# Patient Record
(demographics unavailable — no encounter records)

---

## 2025-03-24 NOTE — DISCUSSION/SUMMARY
[Normal Growth] : growth [Normal Development] : development  [No Elimination Concerns] : elimination [Continue Regimen] : feeding [No Skin Concerns] : skin [Normal Sleep Pattern] : sleep [None] : no medical problems [Anticipatory Guidance Given] : Anticipatory guidance addressed as per the history of present illness section [Age Approp Vaccines] : Age appropriate vaccines administered [No Medications] : ~He/She~ is not on any medications [Parent/Guardian] : Parent/Guardian [] : The components of the vaccine(s) to be administered today are listed in the plan of care. The disease(s) for which the vaccine(s) are intended to prevent and the risks have been discussed with the caretaker.  The risks are also included in the appropriate vaccination information statements which have been provided to the patient's caregiver.  The caregiver has given consent to vaccinate. [FreeTextEntry1] : Well 2 mos old.  Vaccines given.  Increase feeds if will take.  Poly Vi Sol with iron, stop vit D, written dircns for use. store safely  [place] screen - score 10. Last question #10 Hardly Ever. Disc in detail with mother.  No hx of depression anxiety or any pscyh history mother says.  Does get some intrusive thoughts, does not plan on acting on any of them. Does not want to hurt the baby or herself. Mother has told others and they have told her this is normal.  Mother tears up when discussing this.  Gave mother a note re strongly advised to call outpt Post partum support [phone]. Gave online resources also.  Mother interested in going. Father took paper.  I also gave mother the [facility] handout with PPD referral info.  Stressed to mother very important she call them and go for help. Mother expressed agreement.  To ER immediately if think might hurt the baby or yourself or anyone else.   I spoke with  PPD staff member [place], who screens the patients who call the number above. She said there is no PPD crisis clinic, just the regular psych one.  The patient has to call themselves, we cannot give infor and have them called.   Advised both parents to be supportive of each other.    Anticipatory guidance, safety in detail.  Safe crib, back sleep. No soft bedding, no fluffy items in crib. No swaddling.  Do not overdress.  Pacifier use discussed, start after 1 month old if wanted.  Do not leave baby on adult bed or sofa, where baby might roll off.  No pillows around baby. Prevent falls.  No sick visitors.   Avoid contact with people with cold sores.   Fever = rectal temp 100.4 or more. Call us or come in to office for fever.   No honey until after age 1 year old.  Feeding discussed in detail.  No baby food until age 4 months at least.  Stop Vitamin D and start PVS Fe 1 ml a day. Store in safe place away from children.   Car seat use disc, proper use.  Always keep baby fully buckled when in car seat, whether in car or out of car. Do not raise head of bed. Do not leave baby in swing or baby seat or car seat unobserved.  Care that head cannot fall forward and cause trouble breathing. Take baby out and put on back on flat mattress in crib or bassinet when not directly observed.  Smoke detector, CO detector, FE in kitchen, water temp < 125 F.

## 2025-03-24 NOTE — PHYSICAL EXAM
[Alert] : alert [Normocephalic] : normocephalic [Flat Open Anterior Maurice] : flat open anterior fontanelle [PERRL] : PERRL [Red Reflex Bilateral] : red reflex bilateral [Normally Placed Ears] : normally placed ears [Auricles Well Formed] : auricles well formed [Clear Tympanic membranes] : clear tympanic membranes [Light reflex present] : light reflex present [Bony landmarks visible] : bony landmarks visible [Nares Patent] : nares patent [Palate Intact] : palate intact [Uvula Midline] : uvula midline [Supple, full passive range of motion] : supple, full passive range of motion [Symmetric Chest Rise] : symmetric chest rise [Clear to Auscultation Bilaterally] : clear to auscultation bilaterally [Regular Rate and Rhythm] : regular rate and rhythm [S1, S2 present] : S1, S2 present [+2 Femoral Pulses] : +2 femoral pulses [Soft] : soft [Bowel Sounds] : bowel sounds present [Normal external genitailia] : normal external genitalia [Central Urethral Opening] : central urethral opening [Testicles Descended Bilaterally] : testicles descended bilaterally [Normally Placed] : normally placed [No Abnormal Lymph Nodes Palpated] : no abnormal lymph nodes palpated [Symmetric Flexed Extremities] : symmetric flexed extremities [Startle Reflex] : startle reflex present [Suck Reflex] : suck reflex present [Rooting] : rooting reflex present [Palmar Grasp] : palmar grasp reflex present [Plantar Grasp] : plantar grasp reflex present [Symmetric Leilani] : symmetric Dawson [Acute Distress] : no acute distress [Discharge] : no discharge [Palpable Masses] : no palpable masses [Murmurs] : no murmurs [Breast Tissue] : no prominent breast tissue [Tender] : nontender [Distended] : not distended [Hepatomegaly] : no hepatomegaly [Splenomegaly] : no splenomegaly [Mcgowan-Ortolani] : negative Mcgowan-Ortolani [Spinal Dimple] : no spinal dimple [Tuft of Hair] : no tuft of hair [Rash and/or lesion present] : no rash/lesion [FreeTextEntry1] : NAD, active [FreeTextEntry5] : RR++  [FreeTextEntry8] : RR no murmur [FreeTextEntry9] : nl [FreeTextEntry6] : nl testes desc bilat [de-identified] : Mcgowan/Ortolani normal, Galeazzi test normal.  Leg length equal, creases symmetrical, no hip click or clunk. No MA or ITT.

## 2025-03-24 NOTE — HISTORY OF PRESENT ILLNESS
[Parents] : parents [Breast milk] : breast milk [Formula ___ oz/feed] : [unfilled] oz of formula per feed [Vitamins ___] : Patient takes [unfilled] vitamins daily [Normal] : Normal [No] : No cigarette smoke exposure [Water heater temperature set at <120 degrees F] : Water heater temperature set at <120 degrees F [Rear facing car seat in back seat] : Rear facing car seat in back seat [Carbon Monoxide Detectors] : Carbon monoxide detectors at home [Smoke Detectors] : Smoke detectors at home. [At risk for exposure to TB] : Not at risk for exposure to Tuberculosis  [FreeTextEntry1] : 2 mos old infant. Sees follows hears coos. Interacts, smiles.   Safe bassinet or crib. Uses car seat, facing back of car.   Diet- breast milk, formula.   Vitamins - D parents

## 2025-04-24 NOTE — DISCUSSION/SUMMARY
[Normal Growth] : growth [Normal Development] : development  [No Elimination Concerns] : elimination [Continue Regimen] : feeding [Normal Sleep Pattern] : sleep [None] : no medical problems [Anticipatory Guidance Given] : Anticipatory guidance addressed as per the history of present illness section [Age Approp Vaccines] : Age appropriate vaccines administered [No Medications] : ~He/She~ is not on any medications [Parent/Guardian] : Parent/Guardian [] : The components of the vaccine(s) to be administered today are listed in the plan of care. The disease(s) for which the vaccine(s) are intended to prevent and the risks have been discussed with the caretaker.  The risks are also included in the appropriate vaccination information statements which have been provided to the patient's caregiver.  The caregiver has given consent to vaccinate. [FreeTextEntry1] : Well infant mild eczema on face, dry skin on body moisturize, try cereve products.  HC1% bid on face rash, only if worse, bid x few days only.   RTO one mos  Hep B #2 given.

## 2025-04-24 NOTE — PHYSICAL EXAM
[Alert] : alert [Normocephalic] : normocephalic [Flat Open Anterior [place]] : flat open anterior fontanelle [PERRL] : PERRL [Red Reflex Bilateral] : red reflex bilateral [Normally Placed Ears] : normally placed ears [Auricles Well Formed] : auricles well formed [Clear Tympanic membranes] : clear tympanic membranes [Light reflex present] : light reflex present [Bony landmarks visible] : bony landmarks visible [Nares Patent] : nares patent [Palate Intact] : palate intact [Uvula Midline] : uvula midline [Supple, full passive range of motion] : supple, full passive range of motion [Symmetric Chest Rise] : symmetric chest rise [Clear to Auscultation Bilaterally] : clear to auscultation bilaterally [Regular Rate and Rhythm] : regular rate and rhythm [S1, S2 present] : S1, S2 present [+2 Femoral Pulses] : +2 femoral pulses [Soft] : soft [Bowel Sounds] : bowel sounds present [Normal external genitalia] : normal external genitalia [Circumcised] : circumcised [Central Urethral Opening] : central urethral opening [Testicles Descended Bilaterally] : testicles descended bilaterally [Normally Placed] : normally placed [No Abnormal Lymph Nodes Palpated] : no abnormal lymph nodes palpated [Symmetric Flexed Extremities] : symmetric flexed extremities [Startle Reflex] : startle reflex present [Suck Reflex] : suck reflex present [Rooting] : rooting reflex present [Palmar Grasp] : palmar grasp reflex present [Plantar Grasp] : plantar grasp reflex present [Symmetric Leilani] : symmetric Manning [Acute Distress] : no acute distress [Discharge] : no discharge [Palpable Masses] : no palpable masses [Murmurs] : no murmurs [Breast Tissue] : no prominent breast tissue [Tender] : nontender [Distended] : not distended [Hepatomegaly] : no hepatomegaly [Splenomegaly] : no splenomegaly [Mcgowan-Ortolani] : negative Mcgowan-Ortolani [Spinal Dimple] : no spinal dimple [Tuft of Hair] : no tuft of hair [Rash and/or lesion present] : no rash/lesion [FreeTextEntry1] : NAD [FreeTextEntry5] : RR++ LR= [FreeTextEntry8] : RR no murmur [FreeTextEntry9] : nl [FreeTextEntry6] : nl [de-identified] : Mcgowan/Ortolani normal, Galeazzi test normal.  Leg length equal, creases symmetrical, no hip click or clunk. No MA or ITT. [de-identified] : mild dry skin on body pink mild on cheeks

## 2025-04-24 NOTE — HISTORY OF PRESENT ILLNESS
[Breast milk] : breast milk [Expressed Breast milk ___oz/feed] : [unfilled] oz of expressed breast milk per feed [Vitamins ___] : Patient takes [unfilled] vitamins daily [Normal] : Normal [No] : No cigarette smoke exposure [Water heater temperature set at <120 degrees F] : Water heater temperature set at <120 degrees F [Rear facing car seat in back seat] : Rear facing car seat in back seat [Carbon Monoxide Detectors] : Carbon monoxide detectors at home [Smoke Detectors] : Smoke detectors at home. [At risk for exposure to TB] : Not at risk for exposure to Tuberculosis  [FreeTextEntry1] : 3 mos old.  Hears coos.  Follows.   Interacts well, good eye contact.  Smiles, responsive socially.  Up on bent arms.  Rolls - no  Diet- breast fed, nursed and pumps  Sleep- fed q 3 hrs night.   PVS Fe  Mother saw PPD clinic, advised group session and one on one. Mother happy with the setting, plans to attend.